# Patient Record
Sex: FEMALE | Race: WHITE | NOT HISPANIC OR LATINO | URBAN - METROPOLITAN AREA
[De-identification: names, ages, dates, MRNs, and addresses within clinical notes are randomized per-mention and may not be internally consistent; named-entity substitution may affect disease eponyms.]

---

## 2024-01-25 ENCOUNTER — OFFICE VISIT (OUTPATIENT)
Dept: URBAN - METROPOLITAN AREA CLINIC 50 | Facility: CLINIC | Age: 54
End: 2024-01-25
Payer: COMMERCIAL

## 2024-01-25 VITALS
SYSTOLIC BLOOD PRESSURE: 146 MMHG | BODY MASS INDEX: 23.54 KG/M2 | DIASTOLIC BLOOD PRESSURE: 87 MMHG | HEART RATE: 81 BPM | HEIGHT: 67 IN | TEMPERATURE: 97.7 F | WEIGHT: 150 LBS

## 2024-01-25 DIAGNOSIS — K20.80 OTHER ESOPHAGITIS WITHOUT BLEEDING: ICD-10-CM

## 2024-01-25 DIAGNOSIS — K21.9 LARYNGOPHARYNGEAL REFLUX (LPR): ICD-10-CM

## 2024-01-25 PROBLEM — 414581006: Status: ACTIVE | Noted: 2024-01-25

## 2024-01-25 PROCEDURE — 99244 OFF/OP CNSLTJ NEW/EST MOD 40: CPT | Performed by: INTERNAL MEDICINE

## 2024-01-25 PROCEDURE — 99204 OFFICE O/P NEW MOD 45 MIN: CPT | Performed by: INTERNAL MEDICINE

## 2024-01-25 NOTE — HPI-TODAY'S VISIT:
This patient was referred by Dr. Gertrudis Fountain for an evaluation of reflux.  A copy of this will be sent to the referring provider.  53 y.o. 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  Sinus infxn - COVID - dx by ENT w/ LPR Voice always gone Started on PPI - couldn't take them - felt like chest would explode Have tried Pepcid 40mg at night w/o improvement Saw another GI at [facility] - did endoscopy - small hiatal hernia w/ grade B esophagitis and bad gastritis - tried to up Pepcid (40 bid) - hair fell out and cluster headaches No help w/ carafate nor tagamet Back down Pepcid 40mg daily - don't think helping - no bad side effects - don't see anything good Have tons of tums and gaviscon from [place] - feels good Heaviness in chest / pressure + voice comes and goes Lots of burping Eliminated everything  Have bought new bed - sleeping up  Done celiac, u/s, and GES

## 2024-09-06 ENCOUNTER — WEB ENCOUNTER (OUTPATIENT)
Dept: URBAN - METROPOLITAN AREA CLINIC 50 | Facility: CLINIC | Age: 54
End: 2024-09-06

## 2024-09-06 RX ORDER — RABEPRAZOLE SODIUM 20 MG/1
1 TABLET TABLET, DELAYED RELEASE ORAL
Qty: 90 TABLET | Refills: 1
Start: 2024-02-08

## 2024-09-06 RX ORDER — FAMOTIDINE 40 MG/1
1 TABLET AT BEDTIME TABLET, FILM COATED ORAL ONCE A DAY
Qty: 90 TABLET | Refills: 1